# Patient Record
Sex: FEMALE | Race: ASIAN | Employment: FULL TIME | URBAN - METROPOLITAN AREA
[De-identification: names, ages, dates, MRNs, and addresses within clinical notes are randomized per-mention and may not be internally consistent; named-entity substitution may affect disease eponyms.]

---

## 2022-03-18 ENCOUNTER — TELEPHONE (OUTPATIENT)
Dept: SCHEDULING | Facility: IMAGING CENTER | Age: 42
End: 2022-03-18

## 2022-03-22 ENCOUNTER — HOSPITAL ENCOUNTER (OUTPATIENT)
Dept: LAB | Facility: MEDICAL CENTER | Age: 42
End: 2022-03-22
Attending: CLINICAL NURSE SPECIALIST
Payer: COMMERCIAL

## 2022-03-22 ENCOUNTER — OFFICE VISIT (OUTPATIENT)
Dept: MEDICAL GROUP | Facility: IMAGING CENTER | Age: 42
End: 2022-03-22
Payer: COMMERCIAL

## 2022-03-22 VITALS
SYSTOLIC BLOOD PRESSURE: 104 MMHG | DIASTOLIC BLOOD PRESSURE: 64 MMHG | WEIGHT: 156 LBS | TEMPERATURE: 97.6 F | HEIGHT: 60 IN | OXYGEN SATURATION: 96 % | HEART RATE: 70 BPM | BODY MASS INDEX: 30.63 KG/M2

## 2022-03-22 DIAGNOSIS — F43.9 STRESS: ICD-10-CM

## 2022-03-22 DIAGNOSIS — H81.13 BENIGN PAROXYSMAL POSITIONAL VERTIGO DUE TO BILATERAL VESTIBULAR DISORDER: ICD-10-CM

## 2022-03-22 DIAGNOSIS — E66.9 OBESITY (BMI 30.0-34.9): ICD-10-CM

## 2022-03-22 DIAGNOSIS — R42 DIZZINESS: ICD-10-CM

## 2022-03-22 DIAGNOSIS — Z11.59 NEED FOR HEPATITIS C SCREENING TEST: ICD-10-CM

## 2022-03-22 DIAGNOSIS — Z76.89 ENCOUNTER TO ESTABLISH CARE WITH NEW DOCTOR: ICD-10-CM

## 2022-03-22 DIAGNOSIS — Z13.31 DEPRESSION SCREENING: ICD-10-CM

## 2022-03-22 DIAGNOSIS — Z12.31 ENCOUNTER FOR SCREENING MAMMOGRAM FOR BREAST CANCER: ICD-10-CM

## 2022-03-22 DIAGNOSIS — F51.01 PRIMARY INSOMNIA: ICD-10-CM

## 2022-03-22 DIAGNOSIS — Z23 NEED FOR VACCINATION: ICD-10-CM

## 2022-03-22 PROBLEM — E66.811 OBESITY (BMI 30.0-34.9): Status: ACTIVE | Noted: 2022-03-22

## 2022-03-22 LAB
25(OH)D3 SERPL-MCNC: 23 NG/ML (ref 30–100)
ALBUMIN SERPL BCP-MCNC: 4.7 G/DL (ref 3.2–4.9)
ALBUMIN/GLOB SERPL: 1.4 G/DL
ALP SERPL-CCNC: 87 U/L (ref 30–99)
ALT SERPL-CCNC: 16 U/L (ref 2–50)
ANION GAP SERPL CALC-SCNC: 10 MMOL/L (ref 7–16)
AST SERPL-CCNC: 15 U/L (ref 12–45)
BASOPHILS # BLD AUTO: 1.4 % (ref 0–1.8)
BASOPHILS # BLD: 0.12 K/UL (ref 0–0.12)
BILIRUB SERPL-MCNC: 0.3 MG/DL (ref 0.1–1.5)
BUN SERPL-MCNC: 15 MG/DL (ref 8–22)
CALCIUM SERPL-MCNC: 9.8 MG/DL (ref 8.5–10.5)
CHLORIDE SERPL-SCNC: 101 MMOL/L (ref 96–112)
CHOLEST SERPL-MCNC: 247 MG/DL (ref 100–199)
CO2 SERPL-SCNC: 27 MMOL/L (ref 20–33)
CREAT SERPL-MCNC: 0.52 MG/DL (ref 0.5–1.4)
EOSINOPHIL # BLD AUTO: 0.24 K/UL (ref 0–0.51)
EOSINOPHIL NFR BLD: 2.7 % (ref 0–6.9)
ERYTHROCYTE [DISTWIDTH] IN BLOOD BY AUTOMATED COUNT: 42.8 FL (ref 35.9–50)
EST. AVERAGE GLUCOSE BLD GHB EST-MCNC: 126 MG/DL
FASTING STATUS PATIENT QL REPORTED: NORMAL
GFR SERPLBLD CREATININE-BSD FMLA CKD-EPI: 120 ML/MIN/1.73 M 2
GLOBULIN SER CALC-MCNC: 3.4 G/DL (ref 1.9–3.5)
GLUCOSE BLD-MCNC: 92 MG/DL (ref 70–100)
GLUCOSE SERPL-MCNC: 91 MG/DL (ref 65–99)
HBA1C MFR BLD: 6 % (ref 4–5.6)
HCT VFR BLD AUTO: 48.4 % (ref 37–47)
HCV AB SER QL: NORMAL
HDLC SERPL-MCNC: 58 MG/DL
HGB BLD-MCNC: 15.7 G/DL (ref 12–16)
IMM GRANULOCYTES # BLD AUTO: 0.03 K/UL (ref 0–0.11)
IMM GRANULOCYTES NFR BLD AUTO: 0.3 % (ref 0–0.9)
LDLC SERPL CALC-MCNC: 168 MG/DL
LYMPHOCYTES # BLD AUTO: 2.05 K/UL (ref 1–4.8)
LYMPHOCYTES NFR BLD: 23.5 % (ref 22–41)
MCH RBC QN AUTO: 28.4 PG (ref 27–33)
MCHC RBC AUTO-ENTMCNC: 32.4 G/DL (ref 33.6–35)
MCV RBC AUTO: 87.5 FL (ref 81.4–97.8)
MONOCYTES # BLD AUTO: 0.56 K/UL (ref 0–0.85)
MONOCYTES NFR BLD AUTO: 6.4 % (ref 0–13.4)
NEUTROPHILS # BLD AUTO: 5.73 K/UL (ref 2–7.15)
NEUTROPHILS NFR BLD: 65.7 % (ref 44–72)
NRBC # BLD AUTO: 0 K/UL
NRBC BLD-RTO: 0 /100 WBC
PLATELET # BLD AUTO: 446 K/UL (ref 164–446)
PMV BLD AUTO: 10.3 FL (ref 9–12.9)
POTASSIUM SERPL-SCNC: 4.7 MMOL/L (ref 3.6–5.5)
PROT SERPL-MCNC: 8.1 G/DL (ref 6–8.2)
RBC # BLD AUTO: 5.53 M/UL (ref 4.2–5.4)
SODIUM SERPL-SCNC: 138 MMOL/L (ref 135–145)
TRIGL SERPL-MCNC: 105 MG/DL (ref 0–149)
TSH SERPL DL<=0.005 MIU/L-ACNC: 1.28 UIU/ML (ref 0.38–5.33)
WBC # BLD AUTO: 8.7 K/UL (ref 4.8–10.8)

## 2022-03-22 PROCEDURE — 99204 OFFICE O/P NEW MOD 45 MIN: CPT | Mod: 25 | Performed by: CLINICAL NURSE SPECIALIST

## 2022-03-22 PROCEDURE — 90471 IMMUNIZATION ADMIN: CPT | Performed by: CLINICAL NURSE SPECIALIST

## 2022-03-22 PROCEDURE — 85025 COMPLETE CBC W/AUTO DIFF WBC: CPT

## 2022-03-22 PROCEDURE — 82306 VITAMIN D 25 HYDROXY: CPT

## 2022-03-22 PROCEDURE — 80053 COMPREHEN METABOLIC PANEL: CPT

## 2022-03-22 PROCEDURE — 82962 GLUCOSE BLOOD TEST: CPT | Performed by: CLINICAL NURSE SPECIALIST

## 2022-03-22 PROCEDURE — 36415 COLL VENOUS BLD VENIPUNCTURE: CPT

## 2022-03-22 PROCEDURE — 86803 HEPATITIS C AB TEST: CPT

## 2022-03-22 PROCEDURE — 84443 ASSAY THYROID STIM HORMONE: CPT

## 2022-03-22 PROCEDURE — 80061 LIPID PANEL: CPT

## 2022-03-22 PROCEDURE — 83036 HEMOGLOBIN GLYCOSYLATED A1C: CPT

## 2022-03-22 PROCEDURE — 90715 TDAP VACCINE 7 YRS/> IM: CPT | Performed by: CLINICAL NURSE SPECIALIST

## 2022-03-22 RX ORDER — MULTIVIT WITH MINERALS/LUTEIN
TABLET ORAL
COMMUNITY
End: 2023-02-28

## 2022-03-22 RX ORDER — MECLIZINE HCL 12.5 MG/1
12.5 TABLET ORAL 3 TIMES DAILY PRN
Qty: 30 TABLET | Refills: 0 | Status: SHIPPED | OUTPATIENT
Start: 2022-03-22 | End: 2022-09-08

## 2022-03-22 ASSESSMENT — ANXIETY QUESTIONNAIRES
3. WORRYING TOO MUCH ABOUT DIFFERENT THINGS: NOT AT ALL
6. BECOMING EASILY ANNOYED OR IRRITABLE: NOT AT ALL
GAD7 TOTAL SCORE: 4
5. BEING SO RESTLESS THAT IT IS HARD TO SIT STILL: NOT AT ALL
7. FEELING AFRAID AS IF SOMETHING AWFUL MIGHT HAPPEN: SEVERAL DAYS
1. FEELING NERVOUS, ANXIOUS, OR ON EDGE: SEVERAL DAYS
4. TROUBLE RELAXING: SEVERAL DAYS
2. NOT BEING ABLE TO STOP OR CONTROL WORRYING: SEVERAL DAYS

## 2022-03-22 ASSESSMENT — PATIENT HEALTH QUESTIONNAIRE - PHQ9
SUM OF ALL RESPONSES TO PHQ QUESTIONS 1-9: 10
CLINICAL INTERPRETATION OF PHQ2 SCORE: 4
5. POOR APPETITE OR OVEREATING: 0 - NOT AT ALL

## 2022-03-22 ASSESSMENT — PAIN SCALES - GENERAL: PAINLEVEL: NO PAIN

## 2022-03-22 NOTE — PROGRESS NOTES
Subjective     Koki Peralta is a 41 y.o. female who presents with Establish Care, Dizziness, Blurred Vision (Light headed), and Requesting Labs            HPI  Koki reports she does not feel depressed but her father passed last year and her friend suffered a stroke last week so stress high.    Dizziness  One month, occasionally worse with lying down with spinning room and blurring vision.  She feels eating too many carbohydrates worsens.  She drinks approximately 40-48 ounces of water a day.  Urinates frequently.  Worse with poor sleep. No headaches, chest pain, shakiness, confusion, nausea, vomiting or palpitations.  No worsening with positional change from sitting to standing.  First occurrence. She uses computer at work and then at home.  Ibuprofen rarely, no aspirin.      Obesity (BMI 30.0-34.9)  She alternates keto diet and then will switch to high carbohydrates.  Now she watches her carb intake but lately more sugar, ice cream, chocolate and pasta.  No exercise last 3 months.    Primary insomnia  Difficulty falling asleep.  Taking tea with melatonin which his helping some.       ROS  See HPI      Allergies   Allergen Reactions   • Aspirin Shortness of Breath     Shortness of breath, sometimes       Current Outpatient Medications on File Prior to Visit   Medication Sig Dispense Refill   • VITAMIN D PO Take  by mouth.     • Ascorbic Acid (VITAMIN C) 1000 MG Tab Take  by mouth.     • NON SPECIFIED CQNOL     • Ibuprofen (MOTRIN PO) Take  by mouth as needed.     • albuterol (PROVENTIL) 90 MCG/ACT AERS Inhale 1-2 Puffs by mouth every four hours as needed for Shortness of Breath. 1 Inhaler 3     No current facility-administered medications on file prior to visit.     Depression Screening    Little interest or pleasure in doing things?  1 - several days   Feeling down, depressed , or hopeless? 3 - nearly every day   Trouble falling or staying asleep, or sleeping too much?  3 - nearly every day   Feeling tired or  having little energy?  3 - nearly every day   Poor appetite or overeating?  0 - not at all   Feeling bad about yourself - or that you are a failure or have let yourself or your family down? 0 - not at all   Trouble concentrating on things, such as reading the newspaper or watching television? 0 - not at all   Moving or speaking so slowly that other people could have noticed.  Or the opposite - being so fidgety or restless that you have been moving around a lot more than usual?  0 - not at all   Thoughts that you would be better off dead, or of hurting yourself?  0 - not at all   Patient Health Questionnaire Score: 10       If depressive symptoms identified deferred to follow up visit unless specifically addressed in assesment and plan.    Interpretation of PHQ-9 Total Score   Score Severity   1-4 No Depression   5-9 Mild Depression   10-14 Moderate Depression   15-19 Moderately Severe Depression   20-27 Severe Depression          Objective     /64 (BP Location: Left arm, Patient Position: Sitting, BP Cuff Size: Adult)   Pulse 70   Temp 36.4 °C (97.6 °F) (Temporal)   Ht 1.524 m (5')   Wt 70.8 kg (156 lb)   LMP 02/10/2022 (Exact Date)   SpO2 96%   BMI 30.47 kg/m²      Physical Exam  Constitutional:       General: She is not in acute distress.     Appearance: Normal appearance. She is not ill-appearing, toxic-appearing or diaphoretic.   HENT:      Head: Normocephalic and atraumatic.   Eyes:      General: No scleral icterus.     Pupils: Pupils are equal, round, and reactive to light.   Cardiovascular:      Rate and Rhythm: Normal rate and regular rhythm.      Heart sounds: Normal heart sounds.   Pulmonary:      Effort: Pulmonary effort is normal.      Breath sounds: Normal breath sounds.   Skin:     General: Skin is warm and dry.   Neurological:      Mental Status: She is alert and oriented to person, place, and time.      Cranial Nerves: Cranial nerves are intact.      Coordination: Coordination is intact.       Gait: Gait normal.   Psychiatric:         Mood and Affect: Mood normal.         Behavior: Behavior normal.         Thought Content: Thought content normal.         Judgment: Judgment normal.              Tristen-Halpike maneuver: positive, no nystagmus but dizziness reproduced worse on right than left       POCT glucose: 92      Assessment & Plan        1. Encounter to establish care with new doctor      2. Depression screening  Score 10 but she attributes this to increased stress    3. Dizziness  Positive Savona-Halpike maneuver.  POCT fasting glucose normal.  I do not believe this is cardiac as she does not have any other associated symptoms.  No neurologic deficit on exam.  DPPV most consistent diagnosis.  See below.    - POCT Glucose  - CBC WITH DIFFERENTIAL; Future  - Comp Metabolic Panel; Future  - HEMOGLOBIN A1C; Future  - TSH WITH REFLEX TO FT4; Future  - VITAMIN D,25 HYDROXY; Future    4. Obesity (BMI 30.0-34.9)  Once dizziness improved, work on increasing exercise.  Reduce sugar intake.     - CBC WITH DIFFERENTIAL; Future  - Comp Metabolic Panel; Future  - HEMOGLOBIN A1C; Future  - Lipid Profile; Future  - TSH WITH REFLEX TO FT4; Future  - VITAMIN D,25 HYDROXY; Future    5. Need for hepatitis C screening test    - HEP C VIRUS ANTIBODY; Future    6. Stress  No medication management at this time. Referral to behavioral health.    - Patient has been identified as having a positive depression screening. Appropriate orders and counseling have been given.  - Referral to Behavioral Health    7. Encounter for screening mammogram for breast cancer    - MA-SCREENING MAMMO BILAT W/TOMOSYNTHESIS W/CAD; Future    8. Need for vaccination    - Tdap Vaccine =>8YO IM    9. Primary insomnia  Continue with herbal tea as this is helping.     10. Benign paroxysmal positional vertigo due to bilateral vestibular disorder  Refer to physical therapy for Epley maneuver.  Meclizine 12.5mg as needed. May double dose if 12.5mg not  sufficient. Side effects reviewed.    - Referral to Physical Therapy  - meclizine (ANTIVERT) 12.5 MG Tab; Take 1 Tablet by mouth 3 times a day as needed.  Dispense: 30 Tablet; Refill: 0      Return if symptoms worsen or fail to improve, for With test results.

## 2022-03-22 NOTE — ASSESSMENT & PLAN NOTE
One month, occasionally worse with lying down with spinning room and blurring vision.  She feels eating too many carbohydrates worsens.  She drinks approximately 40-48 ounces of water a day.  Urinates frequently.  Worse with poor sleep. No headaches, chest pain, shakiness, confusion, nausea, vomiting or palpitations.  No worsening with positional change from sitting to standing.  First occurrence. She uses computer at work and then at home.  Ibuprofen rarely, no aspirin.

## 2022-03-22 NOTE — ASSESSMENT & PLAN NOTE
She alternates keto diet and then will switch to high carbohydrates.  Now she watches her carb intake but lately more sugar, ice cream, chocolate and pasta.  No exercise last 3 months.

## 2022-05-02 ENCOUNTER — APPOINTMENT (OUTPATIENT)
Dept: MEDICAL GROUP | Facility: IMAGING CENTER | Age: 42
End: 2022-05-02
Payer: COMMERCIAL

## 2022-09-08 ENCOUNTER — OFFICE VISIT (OUTPATIENT)
Dept: URGENT CARE | Facility: CLINIC | Age: 42
End: 2022-09-08
Payer: COMMERCIAL

## 2022-09-08 VITALS
WEIGHT: 159.8 LBS | BODY MASS INDEX: 31.37 KG/M2 | RESPIRATION RATE: 16 BRPM | DIASTOLIC BLOOD PRESSURE: 72 MMHG | HEART RATE: 84 BPM | TEMPERATURE: 96.8 F | OXYGEN SATURATION: 96 % | SYSTOLIC BLOOD PRESSURE: 120 MMHG | HEIGHT: 60 IN

## 2022-09-08 DIAGNOSIS — J45.901 ASTHMA WITH ACUTE EXACERBATION, UNSPECIFIED ASTHMA SEVERITY, UNSPECIFIED WHETHER PERSISTENT: ICD-10-CM

## 2022-09-08 DIAGNOSIS — J06.9 VIRAL UPPER RESPIRATORY TRACT INFECTION WITH COUGH: ICD-10-CM

## 2022-09-08 LAB
EXTERNAL QUALITY CONTROL: NORMAL
INT CON NEG: NORMAL
INT CON POS: NORMAL
SARS-COV+SARS-COV-2 AG RESP QL IA.RAPID: NEGATIVE

## 2022-09-08 PROCEDURE — 87426 SARSCOV CORONAVIRUS AG IA: CPT | Performed by: NURSE PRACTITIONER

## 2022-09-08 PROCEDURE — 99214 OFFICE O/P EST MOD 30 MIN: CPT | Mod: CS | Performed by: NURSE PRACTITIONER

## 2022-09-08 RX ORDER — ALBUTEROL SULFATE 90 UG/1
2 AEROSOL, METERED RESPIRATORY (INHALATION) EVERY 6 HOURS PRN
Qty: 8.5 G | Refills: 0 | Status: SHIPPED | OUTPATIENT
Start: 2022-09-08

## 2022-09-08 RX ORDER — PREDNISONE 10 MG/1
20 TABLET ORAL 2 TIMES DAILY
Qty: 20 TABLET | Refills: 0 | Status: SHIPPED | OUTPATIENT
Start: 2022-09-08 | End: 2022-09-13

## 2022-09-08 RX ORDER — BENZONATATE 100 MG/1
100 CAPSULE ORAL 3 TIMES DAILY PRN
Qty: 30 CAPSULE | Refills: 0 | Status: SHIPPED | OUTPATIENT
Start: 2022-09-08 | End: 2023-02-28

## 2022-09-08 ASSESSMENT — FIBROSIS 4 INDEX: FIB4 SCORE: 0.34

## 2022-09-08 NOTE — PROGRESS NOTES
Chief Complaint   Patient presents with    Cough     Pt has a dry cough x 4 days       HISTORY OF PRESENT ILLNESS: Patient is a pleasant 42 y.o. female who presents today due to symptoms which started last week.  She initially presented with URI symptoms including fever, cough, congestion.  Her symptoms have improved but she continues to have a cough.  Denies any recent fever.  She does have a history of asthma, has been wheezing slightly, has recently run out of her albuterol inhaler.  No immunocompromise. Has tried OTC cold medications without significant relief of symptoms. No recent ABX use. No other aggravating or alleviating factors. Reports no known exposure to COVID-19.       Patient Active Problem List    Diagnosis Date Noted    Dizziness 03/22/2022    Obesity (BMI 30.0-34.9) 03/22/2022    Stress 03/22/2022    Primary insomnia 03/22/2022    Benign paroxysmal positional vertigo due to bilateral vestibular disorder 03/22/2022       Allergies:Aspirin    Current Outpatient Medications Ordered in Epic   Medication Sig Dispense Refill    VITAMIN D PO Take  by mouth.      Ascorbic Acid (VITAMIN C) 1000 MG Tab Take  by mouth.      albuterol (PROVENTIL) 90 MCG/ACT AERS Inhale 1-2 Puffs by mouth every four hours as needed for Shortness of Breath. 1 Inhaler 3     No current Epic-ordered facility-administered medications on file.       Past Medical History:   Diagnosis Date    ASTHMA     Tendonitis     right wrist       Social History     Tobacco Use    Smoking status: Never    Smokeless tobacco: Never   Vaping Use    Vaping Use: Never used   Substance Use Topics    Alcohol use: Not Currently     Comment: 4 per months    Drug use: No       Family Status   Relation Name Status    Mo  Alive    Fa  Alive     Family History   Problem Relation Age of Onset    Heart Disease Father        ROS:  Review of Systems   Constitutional: Positive for initial fever, chills, fatigue, malaise. Negative for weight loss.  HENT: Positive  for rhinitis, sore throat. Negative for ear pain, nosebleeds, and neck pain.    Eyes: Negative for vision changes.   Cardiovascular: Negative for chest pain, palpitations, orthopnea and leg swelling.   Respiratory: Positive for cough without sputum production. Negative for shortness of breath and wheezing.   Gastrointestinal: Negative for abdominal pain, vomiting or diarrhea.   Skin: Negative for rash, diaphoresis.     Exam:  /72 (BP Location: Right arm, Patient Position: Sitting, BP Cuff Size: Adult)   Pulse 84   Temp 36 °C (96.8 °F) (Temporal)   Resp 16   Ht 1.524 m (5')   Wt 72.5 kg (159 lb 12.8 oz)   SpO2 96%   General: well-nourished, well-developed female in NAD  Head: normocephalic, atraumatic  Eyes: PERRLA, EOM within normal limits, no conjunctival injection, no scleral icterus, visual fields and acuity grossly intact.  Ears: normal shape and symmetry, no tenderness, no discharge. External canals are without any significant edema or erythema. Tympanic membranes are without any inflammation, no effusion. Gross auditory acuity is intact.  Nose: symmetrical without tenderness, mild discharge, erythema present bilateral nares.  Mouth/Throat: reasonable hygiene, no exudates or tonsillar enlargement. Mild erythema present.   Neck: no masses, range of motion within normal limits, no tracheal deviation.  Lymph: mild cervical adenopathy. No supraclavicular adenopathy.   Neuro: alert and oriented. Cranial nerves 1-12 grossly intact.   Cardiovascular: regular rate and rhythm without murmurs, rubs, or gallops. No edema.   Pulmonary: no distress. Chest is symmetrical with respiration. No wheezes, crackles, or rhonchi.   Musculoskeletal: appropriate muscle tone, gait is stable.  Skin: warm, dry, intact, no clubbing, no cyanosis.   Psych: appropriate mood, affect, judgement.       POC COVID negative      Assessment/Plan:  1. Viral upper respiratory tract infection with cough  POCT SARS-COV Antigen LEE  (Symptomatic only)    benzonatate (TESSALON) 100 MG Cap      2. Asthma with acute exacerbation, unspecified asthma severity, unspecified whether persistent  predniSONE (DELTASONE) 10 MG Tab    albuterol 108 (90 Base) MCG/ACT Aero Soln inhalation aerosol                  Discussed symptoms most likely viral, self limiting illness. Did not see any evidence of a bacterial process. Discussed natural progression and sx care. Tessalon for cough.   Rest, increase fluids, hand and respiratory hygiene. May take OTC medications as directed for symptom relief. STRICT ER precautions.  Prednisone and albuterol for asthma exacerbation.  Supportive care, differential diagnoses, and indications for immediate follow-up discussed with patient.   Pathogenesis of diagnosis discussed including typical length and natural progression.  Instructed to return to clinic or nearest emergency department for any change in condition, further concerns, or worsening of symptoms.  Patient states understanding of the plan of care and discharge instructions.          JAMI Carrera.

## 2023-02-28 ENCOUNTER — OFFICE VISIT (OUTPATIENT)
Dept: URGENT CARE | Facility: CLINIC | Age: 43
End: 2023-02-28
Payer: COMMERCIAL

## 2023-02-28 VITALS
TEMPERATURE: 97.1 F | RESPIRATION RATE: 18 BRPM | OXYGEN SATURATION: 95 % | SYSTOLIC BLOOD PRESSURE: 150 MMHG | BODY MASS INDEX: 32.9 KG/M2 | WEIGHT: 167.6 LBS | DIASTOLIC BLOOD PRESSURE: 104 MMHG | HEIGHT: 60 IN | HEART RATE: 96 BPM

## 2023-02-28 DIAGNOSIS — J98.8 RTI (RESPIRATORY TRACT INFECTION): ICD-10-CM

## 2023-02-28 DIAGNOSIS — R05.1 ACUTE COUGH: ICD-10-CM

## 2023-02-28 DIAGNOSIS — M54.6 ACUTE BILATERAL THORACIC BACK PAIN: ICD-10-CM

## 2023-02-28 DIAGNOSIS — I10 HYPERTENSION, UNSPECIFIED TYPE: ICD-10-CM

## 2023-02-28 DIAGNOSIS — J02.9 PHARYNGITIS, UNSPECIFIED ETIOLOGY: ICD-10-CM

## 2023-02-28 LAB
FLUAV RNA SPEC QL NAA+PROBE: NEGATIVE
FLUBV RNA SPEC QL NAA+PROBE: NEGATIVE
RSV RNA SPEC QL NAA+PROBE: NEGATIVE
S PYO DNA SPEC NAA+PROBE: NOT DETECTED
SARS-COV-2 RNA RESP QL NAA+PROBE: NEGATIVE

## 2023-02-28 PROCEDURE — 87651 STREP A DNA AMP PROBE: CPT | Performed by: NURSE PRACTITIONER

## 2023-02-28 PROCEDURE — 99213 OFFICE O/P EST LOW 20 MIN: CPT | Performed by: NURSE PRACTITIONER

## 2023-02-28 PROCEDURE — 0241U POCT CEPHEID COV-2, FLU A/B, RSV - PCR: CPT | Performed by: NURSE PRACTITIONER

## 2023-02-28 ASSESSMENT — ENCOUNTER SYMPTOMS
NAUSEA: 0
FEVER: 0
VOMITING: 0
COUGH: 1
CHILLS: 0
DIZZINESS: 0
HEADACHES: 1
MYALGIAS: 0
RHINORRHEA: 1
SORE THROAT: 1
BACK PAIN: 1
SHORTNESS OF BREATH: 0
EYE PAIN: 0

## 2023-02-28 ASSESSMENT — FIBROSIS 4 INDEX: FIB4 SCORE: 0.34

## 2023-02-28 NOTE — PROGRESS NOTES
Subjective:   Koki Ricardo is a 42 y.o. female who presents for Cough (X 4 days sore throat, back hurts when coughing)      URI   This is a new problem. Episode onset: 4 days. The problem has been gradually worsening. There has been no fever. Associated symptoms include congestion, coughing, headaches, rhinorrhea and a sore throat. Pertinent negatives include no chest pain, ear pain, nausea, rash or vomiting. She has tried acetaminophen for the symptoms. The treatment provided no relief.     Review of Systems   Constitutional:  Positive for malaise/fatigue. Negative for chills and fever.   HENT:  Positive for congestion, rhinorrhea and sore throat. Negative for ear pain.    Eyes:  Negative for pain.   Respiratory:  Positive for cough. Negative for shortness of breath.    Cardiovascular:  Negative for chest pain.   Gastrointestinal:  Negative for nausea and vomiting.   Genitourinary:  Negative for hematuria.   Musculoskeletal:  Positive for back pain. Negative for myalgias.   Skin:  Negative for rash.   Neurological:  Positive for headaches. Negative for dizziness.     Medications:    albuterol Aers    Allergies: Aspirin    Problem List: Koki Ricardo does not have any pertinent problems on file.    Surgical History:  No past surgical history on file.    Past Social Hx: Koki Ricardo  reports that she has never smoked. She has never used smokeless tobacco. She reports current alcohol use. She reports that she does not use drugs.     Past Family Hx:  Koki Ricardo family history includes Heart Disease in her father.     Problem list, medications, and allergies reviewed by myself today in Epic.     Objective:     BP (!) 140/102   Pulse 96   Temp 36.2 °C (97.1 °F) (Temporal)   Resp 18   Ht 1.524 m (5') Comment: per pt  Wt 76 kg (167 lb 9.6 oz) Comment: w shoes, bag, and jacket  LMP 11/15/2022 (Approximate)   SpO2 95%   BMI 32.73 kg/m²     Physical Exam  Vitals and nursing note reviewed.    Constitutional:       General: She is not in acute distress.     Appearance: She is well-developed.   HENT:      Head: Normocephalic and atraumatic.      Right Ear: Tympanic membrane and external ear normal.      Left Ear: Tympanic membrane and external ear normal.      Nose: Nose normal.      Right Sinus: No maxillary sinus tenderness or frontal sinus tenderness.      Left Sinus: No maxillary sinus tenderness or frontal sinus tenderness.      Mouth/Throat:      Mouth: Mucous membranes are moist.      Pharynx: Uvula midline. No posterior oropharyngeal erythema.      Tonsils: No tonsillar exudate or tonsillar abscesses.   Eyes:      General:         Right eye: No discharge.         Left eye: No discharge.      Conjunctiva/sclera: Conjunctivae normal.   Cardiovascular:      Rate and Rhythm: Normal rate.   Pulmonary:      Effort: Pulmonary effort is normal. No respiratory distress.      Breath sounds: Normal breath sounds.   Abdominal:      General: There is no distension.   Musculoskeletal:         General: Normal range of motion.      Cervical back: Normal.      Thoracic back: Tenderness present.      Lumbar back: Normal.   Skin:     General: Skin is warm and dry.   Neurological:      General: No focal deficit present.      Mental Status: She is alert and oriented to person, place, and time. Mental status is at baseline.      Gait: Gait (gait at baseline) normal.   Psychiatric:         Judgment: Judgment normal.       Assessment/Plan:     Diagnosis and associated orders:     1. Pharyngitis, unspecified etiology  POCT Cepheid Group A Strep - PCR      2. RTI (respiratory tract infection)  POCT Cepheid CoV-2, Flu A/B, RSV - PCR      3. Acute cough        4. Hypertension, unspecified type        5. Acute bilateral thoracic back pain             Comments/MDM:     Strep negative  Viral testing negative    It was explained today that due to the viral nature of the pt's illness, we will treat symptomatically today. Avoid  bending, stooping, heavy or repetitive lifting until symptoms resolve.  Avoid prolonged sitting; frequent changes of position may be helpful.  Begin gentle stretching before activity when tolerated  Encouraged OTC supportive meds PRN. Humidification, increase fluids, avoid night time dairy.   Discussed side effects of OTC meds and any prescribed.  Given precautionary s/sx that mandate immediate follow up and evaluation in the ED. Advised of risks of not doing so.    DDX, Supportive care, and indications for immediate follow-up discussed with patient.    Instructed to return to clinic or nearest emergency department if we are not available for any change in condition, further concerns, or worsening of symptoms.    The patient  and/or guardian demonstrated a good understanding and agreed with the treatment plan.      Your blood pressure is elevated here in Urgent Care. Please monitor your blood pressure over the next several days. If your blood pressure continues to be 120/80 or higher please contact your physician for blood pressure management.                   Please note that this dictation was created using voice recognition software. I have made a reasonable attempt to correct obvious errors, but I expect that there are errors of grammar and possibly content that I did not discover before finalizing the note.    This note was electronically signed by Earl HERNANDEZ.

## 2023-02-28 NOTE — LETTER
February 28, 2023         Patient: Koki Ricardo   YOB: 1980   Date of Visit: 2/28/2023           To Whom it May Concern:    Koki Ricardo was seen in my clinic on 2/28/2023. She may return to work on 3/2/23.    If you have any questions or concerns, please don't hesitate to call.        Sincerely,           JAMI Royal.  Electronically Signed

## 2024-07-24 ENCOUNTER — OFFICE VISIT (OUTPATIENT)
Dept: URGENT CARE | Facility: CLINIC | Age: 44
End: 2024-07-24
Payer: COMMERCIAL

## 2024-07-24 VITALS
WEIGHT: 170 LBS | TEMPERATURE: 98.8 F | HEART RATE: 68 BPM | SYSTOLIC BLOOD PRESSURE: 124 MMHG | OXYGEN SATURATION: 97 % | BODY MASS INDEX: 33.38 KG/M2 | DIASTOLIC BLOOD PRESSURE: 84 MMHG | RESPIRATION RATE: 19 BRPM | HEIGHT: 60 IN

## 2024-07-24 DIAGNOSIS — J45.21 MILD INTERMITTENT ASTHMA WITH EXACERBATION: ICD-10-CM

## 2024-07-24 PROCEDURE — 99213 OFFICE O/P EST LOW 20 MIN: CPT | Performed by: PHYSICIAN ASSISTANT

## 2024-07-24 PROCEDURE — 3079F DIAST BP 80-89 MM HG: CPT | Performed by: PHYSICIAN ASSISTANT

## 2024-07-24 PROCEDURE — 3074F SYST BP LT 130 MM HG: CPT | Performed by: PHYSICIAN ASSISTANT

## 2024-07-24 RX ORDER — METHYLPREDNISOLONE 4 MG/1
TABLET ORAL
Qty: 21 TABLET | Refills: 0 | Status: SHIPPED | OUTPATIENT
Start: 2024-07-24

## 2024-07-24 ASSESSMENT — ENCOUNTER SYMPTOMS
HEADACHES: 0
CHILLS: 0
FEVER: 0
SORE THROAT: 0
VOMITING: 0
DIARRHEA: 0
ABDOMINAL PAIN: 0
RHINORRHEA: 1
SWEATS: 0
SPUTUM PRODUCTION: 0
WHEEZING: 0
COUGH: 1
NAUSEA: 0
SHORTNESS OF BREATH: 0
MYALGIAS: 0
HEMOPTYSIS: 0
SINUS PAIN: 0

## 2025-07-02 ENCOUNTER — OFFICE VISIT (OUTPATIENT)
Dept: URGENT CARE | Facility: CLINIC | Age: 45
End: 2025-07-02

## 2025-07-02 VITALS
BODY MASS INDEX: 31.61 KG/M2 | DIASTOLIC BLOOD PRESSURE: 94 MMHG | SYSTOLIC BLOOD PRESSURE: 138 MMHG | OXYGEN SATURATION: 97 % | TEMPERATURE: 98.4 F | HEIGHT: 60 IN | HEART RATE: 70 BPM | RESPIRATION RATE: 21 BRPM | WEIGHT: 161 LBS

## 2025-07-02 DIAGNOSIS — Z76.0 MEDICATION REFILL: ICD-10-CM

## 2025-07-02 DIAGNOSIS — R03.0 ELEVATED BP WITHOUT DIAGNOSIS OF HYPERTENSION: ICD-10-CM

## 2025-07-02 DIAGNOSIS — T30.0 BURN: ICD-10-CM

## 2025-07-02 PROCEDURE — 99213 OFFICE O/P EST LOW 20 MIN: CPT

## 2025-07-02 PROCEDURE — 3080F DIAST BP >= 90 MM HG: CPT

## 2025-07-02 PROCEDURE — 3075F SYST BP GE 130 - 139MM HG: CPT

## 2025-07-02 RX ORDER — MUPIROCIN 2 %
1 OINTMENT (GRAM) TOPICAL 2 TIMES DAILY
Qty: 22 G | Refills: 0 | Status: SHIPPED | OUTPATIENT
Start: 2025-07-02

## 2025-07-02 RX ORDER — SILVER SULFADIAZINE 10 MG/G
1 CREAM TOPICAL DAILY
Qty: 25 G | Refills: 0 | Status: SHIPPED | OUTPATIENT
Start: 2025-07-02

## 2025-07-02 RX ORDER — ALBUTEROL SULFATE 90 UG/1
2 INHALANT RESPIRATORY (INHALATION) EVERY 6 HOURS PRN
Qty: 8.5 G | Refills: 0 | Status: SHIPPED | OUTPATIENT
Start: 2025-07-02

## 2025-07-02 ASSESSMENT — ENCOUNTER SYMPTOMS
MUSCULOSKELETAL NEGATIVE: 1
CHILLS: 0
FEVER: 0
GASTROINTESTINAL NEGATIVE: 1
NEUROLOGICAL NEGATIVE: 1
COUGH: 0
SHORTNESS OF BREATH: 0
EYES NEGATIVE: 1
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
